# Patient Record
Sex: FEMALE | Race: WHITE | NOT HISPANIC OR LATINO
[De-identification: names, ages, dates, MRNs, and addresses within clinical notes are randomized per-mention and may not be internally consistent; named-entity substitution may affect disease eponyms.]

---

## 2019-10-23 PROBLEM — Z00.00 ENCOUNTER FOR PREVENTIVE HEALTH EXAMINATION: Status: ACTIVE | Noted: 2019-10-23

## 2019-10-25 ENCOUNTER — APPOINTMENT (OUTPATIENT)
Dept: OBGYN | Facility: CLINIC | Age: 34
End: 2019-10-25
Payer: COMMERCIAL

## 2019-10-25 ENCOUNTER — OUTPATIENT (OUTPATIENT)
Dept: OUTPATIENT SERVICES | Facility: HOSPITAL | Age: 34
LOS: 1 days | Discharge: HOME | End: 2019-10-25

## 2019-10-25 VITALS
BODY MASS INDEX: 26.68 KG/M2 | HEIGHT: 66 IN | SYSTOLIC BLOOD PRESSURE: 190 MMHG | HEART RATE: 96 BPM | DIASTOLIC BLOOD PRESSURE: 91 MMHG | WEIGHT: 166 LBS

## 2019-10-25 DIAGNOSIS — Z80.3 FAMILY HISTORY OF MALIGNANT NEOPLASM OF BREAST: ICD-10-CM

## 2019-10-25 DIAGNOSIS — Z80.0 FAMILY HISTORY OF MALIGNANT NEOPLASM OF DIGESTIVE ORGANS: ICD-10-CM

## 2019-10-25 DIAGNOSIS — E28.2 POLYCYSTIC OVARIAN SYNDROME: ICD-10-CM

## 2019-10-25 DIAGNOSIS — Z78.9 OTHER SPECIFIED HEALTH STATUS: ICD-10-CM

## 2019-10-25 DIAGNOSIS — Z20.2 CONTACT WITH AND (SUSPECTED) EXPOSURE TO INFECTIONS WITH A PREDOMINANTLY SEXUAL MODE OF TRANSMISSION: ICD-10-CM

## 2019-10-25 DIAGNOSIS — Z87.42 PERSONAL HISTORY OF OTHER DISEASES OF THE FEMALE GENITAL TRACT: ICD-10-CM

## 2019-10-25 LAB
BILIRUB UR QL STRIP: NORMAL
CLARITY UR: NORMAL
COLLECTION METHOD: NORMAL
GLUCOSE UR-MCNC: NORMAL
HCG UR QL: 0.2 EU/DL
HGB UR QL STRIP.AUTO: ABNORMAL
KETONES UR-MCNC: NORMAL
LEUKOCYTE ESTERASE UR QL STRIP: ABNORMAL
NITRITE UR QL STRIP: NORMAL
PH UR STRIP: 7.5
PROT UR STRIP-MCNC: NORMAL
SP GR UR STRIP: 1.02

## 2019-10-25 PROCEDURE — 81003 URINALYSIS AUTO W/O SCOPE: CPT | Mod: QW

## 2019-10-25 PROCEDURE — 99213 OFFICE O/P EST LOW 20 MIN: CPT

## 2019-10-25 RX ORDER — NORETHINDRONE ACETATE AND ETHINYL ESTRADIOL AND FERROUS FUMARATE 1.5-30(21)
KIT ORAL
Refills: 0 | Status: ACTIVE | COMMUNITY

## 2019-10-25 NOTE — HISTORY OF PRESENT ILLNESS
[Menarche Age: ____] : age at menarche was [unfilled] [Definite:  ___ (Date)] : the last menstrual period was [unfilled] [Contraception] : uses contraception [Sexually Active] : is sexually active [Condoms] : uses condoms [Male ___] : [unfilled] male [Oral Contraceptives] : uses oral contraceptives

## 2019-10-25 NOTE — DISCUSSION/SUMMARY
[FreeTextEntry1] : Patient aware boyfriend diagnosed with Hepatitis C. Wants full STD panel. Has no complaints\par Annual due 3 months\par \par Gabriella Arrieta M.D.\par

## 2019-10-25 NOTE — CHIEF COMPLAINT
[Follow Up] : follow up GYN visit [FreeTextEntry1] : Patient is here requesting lab work. Partner was just diagnosed with HCV 2 days ago, unsure of when contracted. denies gyn complaints. noticed slight increase in frequency of urination. Last PAP was within the year.

## 2019-10-28 LAB
C TRACH RRNA SPEC QL NAA+PROBE: NOT DETECTED
HCV AB SER QL: NONREACTIVE
HCV S/CO RATIO: 0.16 S/CO
HIV1+2 AB SPEC QL IA.RAPID: NONREACTIVE
HSV 1+2 IGG SER IA-IMP: NEGATIVE
HSV 1+2 IGG SER IA-IMP: POSITIVE
HSV1 IGG SER QL: <0.01 INDEX
HSV2 IGG SER QL: 2.71 INDEX
N GONORRHOEA RRNA SPEC QL NAA+PROBE: NOT DETECTED
SOURCE AMPLIFICATION: NORMAL
T PALLIDUM AB SER QL IA: NEGATIVE

## 2020-04-14 ENCOUNTER — RX RENEWAL (OUTPATIENT)
Age: 35
End: 2020-04-14

## 2021-11-23 ENCOUNTER — INPATIENT (INPATIENT)
Facility: HOSPITAL | Age: 36
LOS: 0 days | Discharge: HOME | End: 2021-11-24
Attending: FAMILY MEDICINE | Admitting: FAMILY MEDICINE
Payer: COMMERCIAL

## 2021-11-23 VITALS
DIASTOLIC BLOOD PRESSURE: 70 MMHG | TEMPERATURE: 100 F | HEIGHT: 66 IN | HEART RATE: 105 BPM | OXYGEN SATURATION: 100 % | SYSTOLIC BLOOD PRESSURE: 159 MMHG | RESPIRATION RATE: 16 BRPM | WEIGHT: 190.04 LBS

## 2021-11-23 DIAGNOSIS — K21.9 GASTRO-ESOPHAGEAL REFLUX DISEASE WITHOUT ESOPHAGITIS: ICD-10-CM

## 2021-11-23 DIAGNOSIS — R20.0 ANESTHESIA OF SKIN: ICD-10-CM

## 2021-11-23 DIAGNOSIS — F41.9 ANXIETY DISORDER, UNSPECIFIED: ICD-10-CM

## 2021-11-23 DIAGNOSIS — E28.2 POLYCYSTIC OVARIAN SYNDROME: ICD-10-CM

## 2021-11-23 LAB
ALBUMIN SERPL ELPH-MCNC: 4.4 G/DL — SIGNIFICANT CHANGE UP (ref 3.5–5.2)
ALP SERPL-CCNC: 45 U/L — SIGNIFICANT CHANGE UP (ref 30–115)
ALT FLD-CCNC: 17 U/L — SIGNIFICANT CHANGE UP (ref 0–41)
ANION GAP SERPL CALC-SCNC: 13 MMOL/L — SIGNIFICANT CHANGE UP (ref 7–14)
AST SERPL-CCNC: 21 U/L — SIGNIFICANT CHANGE UP (ref 0–41)
BASOPHILS # BLD AUTO: 0.05 K/UL — SIGNIFICANT CHANGE UP (ref 0–0.2)
BASOPHILS NFR BLD AUTO: 0.7 % — SIGNIFICANT CHANGE UP (ref 0–1)
BILIRUB SERPL-MCNC: 0.5 MG/DL — SIGNIFICANT CHANGE UP (ref 0.2–1.2)
BUN SERPL-MCNC: 11 MG/DL — SIGNIFICANT CHANGE UP (ref 10–20)
CALCIUM SERPL-MCNC: 8.9 MG/DL — SIGNIFICANT CHANGE UP (ref 8.5–10.1)
CHLORIDE SERPL-SCNC: 104 MMOL/L — SIGNIFICANT CHANGE UP (ref 98–110)
CO2 SERPL-SCNC: 23 MMOL/L — SIGNIFICANT CHANGE UP (ref 17–32)
CREAT SERPL-MCNC: 0.8 MG/DL — SIGNIFICANT CHANGE UP (ref 0.7–1.5)
EOSINOPHIL # BLD AUTO: 0.08 K/UL — SIGNIFICANT CHANGE UP (ref 0–0.7)
EOSINOPHIL NFR BLD AUTO: 1.2 % — SIGNIFICANT CHANGE UP (ref 0–8)
ERYTHROCYTE [SEDIMENTATION RATE] IN BLOOD: 9 MM/HR — SIGNIFICANT CHANGE UP (ref 0–20)
GLUCOSE SERPL-MCNC: 100 MG/DL — HIGH (ref 70–99)
HCG SERPL QL: NEGATIVE — SIGNIFICANT CHANGE UP
HCT VFR BLD CALC: 41.9 % — SIGNIFICANT CHANGE UP (ref 37–47)
HGB BLD-MCNC: 14 G/DL — SIGNIFICANT CHANGE UP (ref 12–16)
IMM GRANULOCYTES NFR BLD AUTO: 0.1 % — SIGNIFICANT CHANGE UP (ref 0.1–0.3)
LYMPHOCYTES # BLD AUTO: 3.2 K/UL — SIGNIFICANT CHANGE UP (ref 1.2–3.4)
LYMPHOCYTES # BLD AUTO: 46.6 % — SIGNIFICANT CHANGE UP (ref 20.5–51.1)
MAGNESIUM SERPL-MCNC: 1.9 MG/DL — SIGNIFICANT CHANGE UP (ref 1.8–2.4)
MCHC RBC-ENTMCNC: 33.4 G/DL — SIGNIFICANT CHANGE UP (ref 32–37)
MCHC RBC-ENTMCNC: 33.9 PG — HIGH (ref 27–31)
MCV RBC AUTO: 101.5 FL — HIGH (ref 81–99)
MONOCYTES # BLD AUTO: 0.36 K/UL — SIGNIFICANT CHANGE UP (ref 0.1–0.6)
MONOCYTES NFR BLD AUTO: 5.2 % — SIGNIFICANT CHANGE UP (ref 1.7–9.3)
NEUTROPHILS # BLD AUTO: 3.17 K/UL — SIGNIFICANT CHANGE UP (ref 1.4–6.5)
NEUTROPHILS NFR BLD AUTO: 46.2 % — SIGNIFICANT CHANGE UP (ref 42.2–75.2)
NRBC # BLD: 0 /100 WBCS — SIGNIFICANT CHANGE UP (ref 0–0)
PLATELET # BLD AUTO: 325 K/UL — SIGNIFICANT CHANGE UP (ref 130–400)
POTASSIUM SERPL-MCNC: 4 MMOL/L — SIGNIFICANT CHANGE UP (ref 3.5–5)
POTASSIUM SERPL-SCNC: 4 MMOL/L — SIGNIFICANT CHANGE UP (ref 3.5–5)
PROT SERPL-MCNC: 6.8 G/DL — SIGNIFICANT CHANGE UP (ref 6–8)
RBC # BLD: 4.13 M/UL — LOW (ref 4.2–5.4)
RBC # FLD: 12.6 % — SIGNIFICANT CHANGE UP (ref 11.5–14.5)
SARS-COV-2 RNA SPEC QL NAA+PROBE: SIGNIFICANT CHANGE UP
SODIUM SERPL-SCNC: 140 MMOL/L — SIGNIFICANT CHANGE UP (ref 135–146)
WBC # BLD: 6.87 K/UL — SIGNIFICANT CHANGE UP (ref 4.8–10.8)
WBC # FLD AUTO: 6.87 K/UL — SIGNIFICANT CHANGE UP (ref 4.8–10.8)

## 2021-11-23 PROCEDURE — 99285 EMERGENCY DEPT VISIT HI MDM: CPT

## 2021-11-23 PROCEDURE — 70450 CT HEAD/BRAIN W/O DYE: CPT | Mod: 26,MA

## 2021-11-23 PROCEDURE — 99222 1ST HOSP IP/OBS MODERATE 55: CPT

## 2021-11-23 PROCEDURE — 99223 1ST HOSP IP/OBS HIGH 75: CPT

## 2021-11-23 PROCEDURE — 93971 EXTREMITY STUDY: CPT | Mod: 26,RT

## 2021-11-23 RX ORDER — NORETHINDRONE AND ETHINYL ESTRADIOL 0.4-0.035
1 KIT ORAL
Qty: 0 | Refills: 0 | DISCHARGE

## 2021-11-23 RX ORDER — PANTOPRAZOLE SODIUM 20 MG/1
40 TABLET, DELAYED RELEASE ORAL
Refills: 0 | Status: DISCONTINUED | OUTPATIENT
Start: 2021-11-23 | End: 2021-11-24

## 2021-11-23 RX ORDER — FLUOXETINE HCL 10 MG
1 CAPSULE ORAL
Qty: 0 | Refills: 0 | DISCHARGE

## 2021-11-23 RX ORDER — FLUOXETINE HCL 10 MG
10 CAPSULE ORAL DAILY
Refills: 0 | Status: DISCONTINUED | OUTPATIENT
Start: 2021-11-23 | End: 2021-11-24

## 2021-11-23 RX ORDER — CHLORHEXIDINE GLUCONATE 213 G/1000ML
1 SOLUTION TOPICAL
Refills: 0 | Status: DISCONTINUED | OUTPATIENT
Start: 2021-11-23 | End: 2021-11-24

## 2021-11-23 RX ORDER — ACETAMINOPHEN 500 MG
650 TABLET ORAL EVERY 6 HOURS
Refills: 0 | Status: DISCONTINUED | OUTPATIENT
Start: 2021-11-23 | End: 2021-11-24

## 2021-11-23 RX ORDER — PANTOPRAZOLE SODIUM 20 MG/1
1 TABLET, DELAYED RELEASE ORAL
Qty: 0 | Refills: 0 | DISCHARGE

## 2021-11-23 NOTE — H&P ADULT - PROBLEM SELECTOR PLAN 1
-  - CT head w/o abnormality  - RLE venous duplex negative for DVT  - Neuro consult appreciated  - MR head and c-spine w/ and w/o contrast ordered   - Autoimmune labs sent and pending (CRP, NIKKI, RF, SSA, SSB, dsDNA and CCP)  - Check B12, folate and TSH

## 2021-11-23 NOTE — H&P ADULT - HISTORY OF PRESENT ILLNESS
Pt is a 36F w/ PMH GERD and PCOS being admitted for RLE numbness since this morning upon waking for r/o MS vs autoimmune d/o. Pt describes the numbness as a heaviness w/ intermittent tingling that is from hip to foot in the right leg. Pt states that she can walk normally (observed on exam), but that it feels awkward/clumsy. Pt denies FH MS or other autoimmune conditions. Denies hx HA or vision changes. Denies dizziness, fever/chills, cough, rhinorrhea, chest pain, SOB, NVD, abdominal pain, change in urination and change in BM.       In ED, pt is afebrile w/ WBC: 6.8, BP: 159/70, HR: 105, ESR: 9. CT head negative for acute process. RLE venous duplex negative for DVT.

## 2021-11-23 NOTE — H&P ADULT - ASSESSMENT
ASSESSMENT: Pt is a 36F w/ PMH GERD and PCOS being admitted for RLE numbness since this morning upon waking for r/o MS vs autoimmune d/o. In ED, pt is afebrile w/ WBC: 6.8, BP: 159/70, HR: 105, ESR: 9. CT head negative for acute process. RLE venous duplex negative for DVT.         PLAN: Case d/w Dr. Dexter  - Admit to inpatient level of care - medicine  - AM labs  - Monitor vitals   - Ambulate as tolerated   - Continue home medications  - CHG bath  - Regular diet   - VTE ppx: ambulation  - GI ppx: on protonix

## 2021-11-23 NOTE — H&P ADULT - NSICDXPASTMEDICALHX_GEN_ALL_CORE_FT
PAST MEDICAL HISTORY:  Acid reflux     Heart murmur     Hidradenitis suppurativa     PCOS (polycystic ovarian syndrome)     Vasovagal syncope

## 2021-11-23 NOTE — ED PROVIDER NOTE - PROGRESS NOTE DETAILS
Camilo: 36F on OCPs presents for RLE numbness/tingling started acutely this morning. Neuro exam reveals RLE numbness and difficulty ambulating on heels. Plan is CT head, RLE duplex, labs.  Neurology consulted, spoke to Dr. Rico. Neurology will evaluate pt. Camilo: 36F on OCPs presents for RLE numbness/tingling started acutely this morning. CV RRR, lungs CTAB, R calf nontender. Neuro exam reveals RLE numbness and difficulty ambulating on heels. Plan is CT head, RLE duplex, labs.  Neurology consulted, spoke to Dr. Rico. Neurology will evaluate pt. Camilo: Spoke to Dr. Martinez, plan to admit pt for MRI. Pt agreeable with plan. Covid swab sent.

## 2021-11-23 NOTE — CONSULT NOTE ADULT - ASSESSMENT
ASSESSMENT: Pt is a 36F w/ PMH PCOS w/ chief complaint of RLE numbness since this morning upon waking.        PLAN: Case d/w Dr. Rico   - R/o MS vs other autoimmune pathology  - Recommend MR head and c-spine with and without contrast  - Check B12, folate and TSH  - Send labs for RF, NIKKI, ds-DNA, SSA, SSB, ESR, CRP and anti-CCP   - Neuro to follow

## 2021-11-23 NOTE — ED PROVIDER NOTE - OBJECTIVE STATEMENT
36F PMHx PCOS on OCPs, acid reflux presents for RLE numbness/tingling that started this morning. Pt was walking in her house when she stumbled and had to catch herself on the wall. Still able to ambulate but feels her L leg has to compensate for her R leg due to numbness sensation. She was feeling her normal self yesterday. Denies back pain, incontinence, saddle anesthesia, fever/chills, chest pain, sob, abd pain, n/v/d, normal PO intake. Denies numbness/tingling/weakness in all other extremities. Reports this has never happened to her before.

## 2021-11-23 NOTE — ED PROVIDER NOTE - PHYSICAL EXAMINATION
VITAL SIGNS: I have reviewed nursing notes and confirm.  CONSTITUTIONAL: well-appearing, non-toxic, NAD  SKIN: Warm dry, normal skin turgor  HEAD: NCAT  EYES: EOMI, PERRL, no scleral icterus  ENT: Moist mucous membranes, normal pharynx with no erythema or exudates  NECK: Supple; non tender. Full ROM.   CARD: RRR, no murmurs, rubs or gallops  RESP: clear to ausculation b/l.  No rales, rhonchi, or wheezing.  ABD: soft, non-tender, non-distended, no rebound or guarding.   EXT: Full ROM, no bony tenderness, no calf tenderness  NEURO: PERRL, EOMI, tongue midline, V1-V3 intact, shoulder shrug normal bl, normal motor strength bilaterally, +numbness/tingling of RLE, negative Romberg, negative pronator drift, normal finger to nose, normal gait, difficulty ambulating on heels but able to ambulate on toes  PSYCH: Cooperative, appropriate.

## 2021-11-23 NOTE — CONSULT NOTE ADULT - SUBJECTIVE AND OBJECTIVE BOX
Neurology Consult  Pt is a 36F w/ PMH PCOS w/ chief complaint of RLE numbness since this morning upon waking. Pt describes the numbness as a heaviness w/ intermittent tingling that is from hip to foot in the right leg. Pt states that she can walk normally (observed on exam), but that it feels awkward/clumsy. Pt denies FH MS or other autoimmune conditions. Denies hx HA or vision changes. CT head negative for acute findings. Duplex of b/l LE was negative for DVT.       PAST MEDICAL & SURGICAL HISTORY:  Acid reflux  PCOS (polycystic ovarian syndrome)  Heart murmur      FAMILY HISTORY: No FH neurological or rheumatological conditions       Social History: +vaping, social drinking, denies drug use    Allergies  No Known Allergies        Vital Signs Last 24 Hrs  T(C): 37.6 (23 Nov 2021 08:16), Max: 37.6 (23 Nov 2021 08:16)  T(F): 99.6 (23 Nov 2021 08:16), Max: 99.6 (23 Nov 2021 08:16)  HR: 105 (23 Nov 2021 08:16) (105 - 105)  BP: 159/70 (23 Nov 2021 08:16) (159/70 - 159/70)  BP(mean): --  RR: 16 (23 Nov 2021 08:16) (16 - 16)  SpO2: 100% (23 Nov 2021 08:16) (100% - 100%)    Examination:  General:  Appearance is consistent with chronologic age.  No abnormal facies.   Cognitive/Language:  The patient is oriented to person, place, time and date.  Recent and remote memory intact.  Fund of knowledge is intact and normal.  Language with normal repetition, comprehension and naming.  Nondysarthric.    Eyes: intact VA, VFF.  EOMI w/o nystagmus, skew or reported double vision.  PERRL.   Face:  Facial sensation normal V1 - 3, no facial asymmetry.    Ears/Nose/Throat:  Hearing grossly intact b/l.  Palate elevates midline.  Tongue and uvula midline.   Motor examination:   Normal tone, bulk and range of motion.  No tenderness, twitching, tremors or involuntary movements.  Formal Muscle Strength Testing: (MRC grade R/L) 5/5 UE; 5/5 LE.  No observable drift.  Reflexes:  + hyperreflexia in b/l pectoralis, biceps, triceps, brachioradialis, patella and Achilles. +clonus  Sensory examination:   Intact to light touch and temperature in all extremities.  Cerebellum:   FTN/HKS intact with normal RICHA in all limbs.  No dysmetria or dysdiadokinesia.  Gait narrow based and normal.        Labs:   CBC Full  -  ( 23 Nov 2021 08:20 )  WBC Count : 6.87 K/uL  RBC Count : 4.13 M/uL  Hemoglobin : 14.0 g/dL  Hematocrit : 41.9 %  Platelet Count - Automated : 325 K/uL  Mean Cell Volume : 101.5 fL  Mean Cell Hemoglobin : 33.9 pg  Mean Cell Hemoglobin Concentration : 33.4 g/dL  Auto Neutrophil # : 3.17 K/uL  Auto Lymphocyte # : 3.20 K/uL  Auto Monocyte # : 0.36 K/uL  Auto Eosinophil # : 0.08 K/uL  Auto Basophil # : 0.05 K/uL  Auto Neutrophil % : 46.2 %  Auto Lymphocyte % : 46.6 %  Auto Monocyte % : 5.2 %  Auto Eosinophil % : 1.2 %  Auto Basophil % : 0.7 %    11-23    140  |  104  |  11  ----------------------------<  100<H>  4.0   |  23  |  0.8    Ca    8.9      23 Nov 2021 08:20  Mg     1.9     11-23    TPro  6.8  /  Alb  4.4  /  TBili  0.5  /  DBili  x   /  AST  21  /  ALT  17  /  AlkPhos  45  11-23    LIVER FUNCTIONS - ( 23 Nov 2021 08:20 )  Alb: 4.4 g/dL / Pro: 6.8 g/dL / ALK PHOS: 45 U/L / ALT: 17 U/L / AST: 21 U/L / GGT: x                   Neuroimaging:  < from: CT Head No Cont (11.23.21 @ 08:46) >  IMPRESSION:    No acute intracranial pathology, stable exam since 8/25/2013.    --- End of Report ---    < end of copied text >

## 2021-11-23 NOTE — H&P ADULT - PROBLEM SELECTOR PLAN 2
-  - Currently on Junel 1.5/30 birth control  - Instructed pt to bring in BC from home and we will verify with pharmacy

## 2021-11-23 NOTE — ED PROVIDER NOTE - NS ED ROS FT
Constitutional: (-) diaphoresis  Eyes/ENT: (-) runny nose  Cardiovascular: (-) chest pain  Respiratory: (-) cough  Gastrointestinal: (-) vomiting, (-) diarrhea  : (-) dysuria   Musculoskeletal: (-) joint pain  Integumentary: (-) rash  Neurological: see HPI  Allergic/Immunologic: (-) pruritus

## 2021-11-23 NOTE — H&P ADULT - NSHPLABSRESULTS_GEN_ALL_CORE
14.0   6.87  )-----------( 325      ( 23 Nov 2021 08:20 )             41.9       11-23    140  |  104  |  11  ----------------------------<  100<H>  4.0   |  23  |  0.8    Ca    8.9      23 Nov 2021 08:20  Mg     1.9     11-23    TPro  6.8  /  Alb  4.4  /  TBili  0.5  /  DBili  x   /  AST  21  /  ALT  17  /  AlkPhos  45  11-23          Magnesium, Serum: 1.9 mg/dL (11-23-21 @ 08:20)    Serum Pregnancy: Negative (11-23-21 @ 08:20)          < from: CT Head No Cont (11.23.21 @ 08:46) >    IMPRESSION:    No acute intracranial pathology, stable exam since 8/25/2013.    --- End of Report ---    < end of copied text >    < from: CT Head No Cont (11.23.21 @ 08:46) >

## 2021-11-23 NOTE — H&P ADULT - NSHPPHYSICALEXAM_GEN_ALL_CORE
T(C): 37.6 (11-23-21 @ 08:16), Max: 37.6 (11-23-21 @ 08:16)  HR: 105 (11-23-21 @ 08:16) (105 - 105)  BP: 159/70 (11-23-21 @ 08:16) (159/70 - 159/70)  RR: 16 (11-23-21 @ 08:16) (16 - 16)  SpO2: 100% (11-23-21 @ 08:16) (100% - 100%)    PHYSICAL EXAM:  GENERAL: NAD, AOx3  HEAD:  Atraumatic, Normocephalic  EYES: EOMI, PERRLA, conjunctiva and sclera clear  CHEST/LUNG: Clear to auscultation bilaterally; No rales, rhonchi or wheezing  HEART: S1,S2 Regular rate and rhythm; No murmurs, rubs, or gallops  ABDOMEN: Soft, nontender, nondistended, no rebound tenderness; +BS  EXTREMITIES:  2+ peripheral pulses bilaterally and symmetrically, no clubbing, cyanosis, or edema  PSYCH: AAOx3, normal mood and affect  NEUROLOGY: +hyperreflexia, +clonus, muscle strength 5/5 throughout, no sensory deficits   SKIN: no rashes

## 2021-11-23 NOTE — CONSULT NOTE ADULT - ATTENDING COMMENTS
Patient seen and examined and agree with above except as noted. Patients history, notes, labs, imaging, vitals and meds reviewed personally.    Patient woke this morning with numbness of the right LE from the hip down.  Exam shows hyper reflexia throughout slightly brisker on the right    Plan as above (discussed with patient the possible differentials)

## 2021-11-23 NOTE — ED ADULT TRIAGE NOTE - CHIEF COMPLAINT QUOTE
"I woke up this morning. My right leg was tingling. It threw my balance off and I went into the wall. It feels like dead weight."

## 2021-11-23 NOTE — ED PROVIDER NOTE - ATTENDING CONTRIBUTION TO CARE
0
36 y.o. F, PMH of PCOS w/ chief complaint of RLE numbness since this morning upon waking. Pt describes the numbness as a heaviness w/ intermittent tingling that is from hip to foot in the right leg. Pt states that she can walk normally, but it feels awkward/clumsy. No HA. No n/v/c/d. No neck pain. No back pain. No leg weakness. No bowel/bladder incontinence. On exam, pt in NAD, AAOx3, Eyes: intact VA, EOMI w/o nystagmus, PERRL. CN II-XII intact. Hearing grossly intact b/l.  Palate elevates midline.  Tongue and uvula midline. Motor examination:   Normal tone, bulk and range of motion.  No tenderness, twitching, tremors or involuntary movements. Muscle Strength: 5/5 UE; 5/5 LE. No drift. Reflexes: (+)hyperreflexia in b/l pectoralis, biceps, triceps, brachioradialis, patella and Achilles. Sensory examination: Intact to light touch and temperature in all extremities. Finger to nose intact. Gait narrow based and normal. Labs/CT done and reviewed. Neuro evaluated pt. Will admit for MRI.

## 2021-11-23 NOTE — ED PROVIDER NOTE - CLINICAL SUMMARY MEDICAL DECISION MAKING FREE TEXT BOX
36 y.o. F, PMH of PCOS w/ chief complaint of RLE numbness since this morning upon waking. Pt describes the numbness as a heaviness w/ intermittent tingling that is from hip to foot in the right leg. Pt states that she can walk normally, but it feels awkward/clumsy. No HA. No n/v/c/d. No neck pain. No back pain. No leg weakness. No bowel/bladder incontinence. On exam, pt in NAD, AAOx3, Eyes: intact VA, EOMI w/o nystagmus, PERRL. CN II-XII intact. Hearing grossly intact b/l.  Palate elevates midline.  Tongue and uvula midline. Motor examination:   Normal tone, bulk and range of motion.  No tenderness, twitching, tremors or involuntary movements. Muscle Strength: 5/5 UE; 5/5 LE. No drift. Reflexes: (+)hyperreflexia in b/l pectoralis, biceps, triceps, brachioradialis, patella and Achilles. Sensory examination: Intact to light touch and temperature in all extremities. Finger to nose intact. Gait narrow based and normal. Labs/CT done and reviewed. Neuro evaluated pt. Will admit for MRI.

## 2021-11-24 ENCOUNTER — TRANSCRIPTION ENCOUNTER (OUTPATIENT)
Age: 36
End: 2021-11-24

## 2021-11-24 VITALS
HEART RATE: 78 BPM | DIASTOLIC BLOOD PRESSURE: 59 MMHG | RESPIRATION RATE: 18 BRPM | SYSTOLIC BLOOD PRESSURE: 122 MMHG | TEMPERATURE: 98 F

## 2021-11-24 LAB
ANION GAP SERPL CALC-SCNC: 11 MMOL/L — SIGNIFICANT CHANGE UP (ref 7–14)
BUN SERPL-MCNC: 11 MG/DL — SIGNIFICANT CHANGE UP (ref 10–20)
CALCIUM SERPL-MCNC: 8.8 MG/DL — SIGNIFICANT CHANGE UP (ref 8.5–10.1)
CCP IGG SERPL-ACNC: <8 UNITS — SIGNIFICANT CHANGE UP
CHLORIDE SERPL-SCNC: 104 MMOL/L — SIGNIFICANT CHANGE UP (ref 98–110)
CO2 SERPL-SCNC: 25 MMOL/L — SIGNIFICANT CHANGE UP (ref 17–32)
COVID-19 NUCLEOCAPSID GAM AB INTERP: NEGATIVE — SIGNIFICANT CHANGE UP
COVID-19 NUCLEOCAPSID TOTAL GAM ANTIBODY RESULT: 0.18 INDEX — SIGNIFICANT CHANGE UP
COVID-19 SPIKE DOMAIN AB INTERP: POSITIVE
COVID-19 SPIKE DOMAIN ANTIBODY RESULT: 224 U/ML — HIGH
CREAT SERPL-MCNC: 0.8 MG/DL — SIGNIFICANT CHANGE UP (ref 0.7–1.5)
CRP SERPL-MCNC: 6 MG/L — HIGH
DSDNA AB FLD-ACNC: <0.2 AI — SIGNIFICANT CHANGE UP
DSDNA AB SER-ACNC: <12 IU/ML — SIGNIFICANT CHANGE UP
ENA SS-A AB FLD IA-ACNC: <0.2 AI — SIGNIFICANT CHANGE UP
FOLATE SERPL-MCNC: 8.7 NG/ML — SIGNIFICANT CHANGE UP
GLUCOSE SERPL-MCNC: 95 MG/DL — SIGNIFICANT CHANGE UP (ref 70–99)
HCT VFR BLD CALC: 40.1 % — SIGNIFICANT CHANGE UP (ref 37–47)
HGB BLD-MCNC: 13.6 G/DL — SIGNIFICANT CHANGE UP (ref 12–16)
MCHC RBC-ENTMCNC: 33.9 G/DL — SIGNIFICANT CHANGE UP (ref 32–37)
MCHC RBC-ENTMCNC: 34.2 PG — HIGH (ref 27–31)
MCV RBC AUTO: 100.8 FL — HIGH (ref 81–99)
NRBC # BLD: 0 /100 WBCS — SIGNIFICANT CHANGE UP (ref 0–0)
PLATELET # BLD AUTO: 295 K/UL — SIGNIFICANT CHANGE UP (ref 130–400)
POTASSIUM SERPL-MCNC: 4.2 MMOL/L — SIGNIFICANT CHANGE UP (ref 3.5–5)
POTASSIUM SERPL-SCNC: 4.2 MMOL/L — SIGNIFICANT CHANGE UP (ref 3.5–5)
RBC # BLD: 3.98 M/UL — LOW (ref 4.2–5.4)
RBC # FLD: 12.5 % — SIGNIFICANT CHANGE UP (ref 11.5–14.5)
RF+CCP IGG SER-IMP: NEGATIVE — SIGNIFICANT CHANGE UP
RHEUMATOID FACT SERPL-ACNC: <10 IU/ML — SIGNIFICANT CHANGE UP (ref 0–13)
SARS-COV-2 IGG+IGM SERPL QL IA: 0.18 INDEX — SIGNIFICANT CHANGE UP
SARS-COV-2 IGG+IGM SERPL QL IA: 224 U/ML — HIGH
SARS-COV-2 IGG+IGM SERPL QL IA: NEGATIVE — SIGNIFICANT CHANGE UP
SARS-COV-2 IGG+IGM SERPL QL IA: POSITIVE
SODIUM SERPL-SCNC: 140 MMOL/L — SIGNIFICANT CHANGE UP (ref 135–146)
TSH SERPL-MCNC: 7.59 UIU/ML — HIGH (ref 0.27–4.2)
VIT B12 SERPL-MCNC: 577 PG/ML — SIGNIFICANT CHANGE UP (ref 232–1245)
WBC # BLD: 8.72 K/UL — SIGNIFICANT CHANGE UP (ref 4.8–10.8)
WBC # FLD AUTO: 8.72 K/UL — SIGNIFICANT CHANGE UP (ref 4.8–10.8)

## 2021-11-24 PROCEDURE — 72156 MRI NECK SPINE W/O & W/DYE: CPT | Mod: 26

## 2021-11-24 PROCEDURE — 99232 SBSQ HOSP IP/OBS MODERATE 35: CPT

## 2021-11-24 PROCEDURE — 70553 MRI BRAIN STEM W/O & W/DYE: CPT | Mod: 26

## 2021-11-24 PROCEDURE — 99233 SBSQ HOSP IP/OBS HIGH 50: CPT

## 2021-11-24 RX ADMIN — PANTOPRAZOLE SODIUM 40 MILLIGRAM(S): 20 TABLET, DELAYED RELEASE ORAL at 05:44

## 2021-11-24 NOTE — PROGRESS NOTE ADULT - SUBJECTIVE AND OBJECTIVE BOX
Patient is a 36y old  Female who presents with a chief complaint of RLE numbness which spontaneously resolved, pt was consulted by neurology, MRI ( brain and C-spine) was normal, pt was cleared for discharge home with PMD and neurology follow up shortly after discharge.   Today pt feels OK and denies any complaints.       Vital Signs Last 24 Hrs  T(C): 36.6 (24 Nov 2021 05:00), Max: 37.3 (23 Nov 2021 17:45)  T(F): 97.9 (24 Nov 2021 05:00), Max: 99.1 (23 Nov 2021 17:45)  HR: 78 (24 Nov 2021 05:00) (71 - 78)  BP: 122/59 (24 Nov 2021 05:00) (116/57 - 152/69)  BP(mean): --  RR: 18 (24 Nov 2021 05:00) (18 - 18)    PHYSICAL EXAM:  GENERAL: NAD, well-groomed, well-developed  HEAD:  Atraumatic, Normocephalic  EYES: EOMI, PERRLA, conjunctiva and sclera clear  ENMT: No tonsillar erythema, exudates, or enlargement; Moist mucous membranes, Good dentition, No lesions  NECK: Supple, No JVD, Normal thyroid  NERVOUS SYSTEM:  Alert & Oriented X3, Good concentration; Motor Strength 5/5 B/L upper and lower extremities; DTRs 2+ intact and symmetric  CHEST/LUNG: Clear to percussion bilaterally; No rales, rhonchi, wheezing, or rubs  HEART: Regular rate and rhythm; No murmurs, rubs, or gallops  ABDOMEN: Soft, Nontender, Nondistended; Bowel sounds present  EXTREMITIES:  2+ Peripheral Pulses, No clubbing, cyanosis, or edema  LYMPH: No lymphadenopathy noted  SKIN: No rashes or lesions      LABS:                        13.6   8.72  )-----------( 295      ( 24 Nov 2021 06:19 )             40.1     11-24    140  |  104  |  11  ----------------------------<  95  4.2   |  25  |  0.8    Ca    8.8      24 Nov 2021 06:19  Mg     1.9     11-23    TPro  6.8  /  Alb  4.4  /  TBili  0.5  /  DBili  x   /  AST  21  /  ALT  17  /  AlkPhos  45  11-23      RADIOLOGY & ADDITIONAL TESTS:  < from: MR Cervical Spine w/wo IV Cont (11.24.21 @ 13:22) >  IMPRESSION:    No evidence of abnormal spinal cord signal or enhancement to suggest demyelination.    < end of copied text >  < from: MR Head w/wo IV Cont (11.24.21 @ 13:12) >    IMPRESSION:    Unremarkable contrast-enhanced examination of the brain.    < end of copied text >    MEDICATIONS  (STANDING):  chlorhexidine 4% Liquid 1 Application(s) Topical <User Schedule>  FLUoxetine 10 milliGRAM(s) Oral daily  pantoprazole    Tablet 40 milliGRAM(s) Oral before breakfast    MEDICATIONS  (PRN):  acetaminophen     Tablet .. 650 milliGRAM(s) Oral every 6 hours PRN Temp greater or equal to 38C (100.4F), Mild Pain (1 - 3)      
Neurology Progress Note    Interval History:  Patient seen at bedside this AM. No acute interval events. Pt reports resolution of numbness of RLE. Hyperreflexia of b/l UE resolved, but still present to b/l LE. Head CT WNL. MRI pending.     HPI:  Pt is a 36F w/ PMH GERD and PCOS being admitted for RLE numbness since this morning upon waking for r/o MS vs autoimmune d/o. Pt describes the numbness as a heaviness w/ intermittent tingling that is from hip to foot in the right leg. Pt states that she can walk normally (observed on exam), but that it feels awkward/clumsy. Pt denies FH MS or other autoimmune conditions. Denies hx HA or vision changes. Denies dizziness, fever/chills, cough, rhinorrhea, chest pain, SOB, NVD, abdominal pain, change in urination and change in BM.       In ED, pt is afebrile w/ WBC: 6.8, BP: 159/70, HR: 105, ESR: 9. CT head negative for acute process. RLE venous duplex negative for DVT.  (23 Nov 2021 12:36)      PAST MEDICAL & SURGICAL HISTORY:  Heart murmur  Vasovagal syncope  Hidradenitis suppurativa  PCOS (polycystic ovarian syndrome)  Acid reflux  No significant past surgical history      Medications:  acetaminophen     Tablet .. 650 milliGRAM(s) Oral every 6 hours PRN  chlorhexidine 4% Liquid 1 Application(s) Topical <User Schedule>  FLUoxetine 10 milliGRAM(s) Oral daily  pantoprazole    Tablet 40 milliGRAM(s) Oral before breakfast      Vital Signs Last 24 Hrs  T(C): 36.6 (24 Nov 2021 05:00), Max: 37.3 (23 Nov 2021 17:45)  T(F): 97.9 (24 Nov 2021 05:00), Max: 99.1 (23 Nov 2021 17:45)  HR: 78 (24 Nov 2021 05:00) (71 - 78)  BP: 122/59 (24 Nov 2021 05:00) (116/57 - 152/69)  BP(mean): --  RR: 18 (24 Nov 2021 05:00) (18 - 18)  SpO2: --    Examination:  General:  Appearance is consistent with chronologic age.  No abnormal facies.   Cognitive/Language:  The patient is oriented to person, place, time and date.  Recent and remote memory intact.  Fund of knowledge is intact and normal.  Language with normal repetition, comprehension and naming.  Nondysarthric.    Eyes: intact VA, VFF.  EOMI w/o nystagmus, skew or reported double vision.  PERRL.   Face:  Facial sensation normal V1 - 3, no facial asymmetry.    Ears/Nose/Throat:  Hearing grossly intact b/l.  Palate elevates midline.  Tongue and uvula midline.   Motor examination:   Normal tone, bulk and range of motion.  No tenderness, twitching, tremors or involuntary movements.  Formal Muscle Strength Testing: (MRC grade R/L) 5/5 UE; 5/5 LE.  No observable drift.  Reflexes:  +2 reflexes in b/l pectoralis, biceps, triceps, and brachioradialis. +hyperreflexia in patella and Achilles. +clonus  Sensory examination:   Intact to light touch and temperature in all extremities.  Cerebellum:   FTN/HKS intact with normal RICHA in all limbs.  No dysmetria or dysdiadokinesia.  Gait narrow based and normal.    Labs:  CBC Full  -  ( 24 Nov 2021 06:19 )  WBC Count : 8.72 K/uL  RBC Count : 3.98 M/uL  Hemoglobin : 13.6 g/dL  Hematocrit : 40.1 %  Platelet Count - Automated : 295 K/uL  Mean Cell Volume : 100.8 fL  Mean Cell Hemoglobin : 34.2 pg  Mean Cell Hemoglobin Concentration : 33.9 g/dL  Auto Neutrophil # : x  Auto Lymphocyte # : x  Auto Monocyte # : x  Auto Eosinophil # : x  Auto Basophil # : x  Auto Neutrophil % : x  Auto Lymphocyte % : x  Auto Monocyte % : x  Auto Eosinophil % : x  Auto Basophil % : x    11-24    140  |  104  |  11  ----------------------------<  95  4.2   |  25  |  0.8    Ca    8.8      24 Nov 2021 06:19  Mg     1.9     11-23    TPro  6.8  /  Alb  4.4  /  TBili  0.5  /  DBili  x   /  AST  21  /  ALT  17  /  AlkPhos  45  11-23    LIVER FUNCTIONS - ( 23 Nov 2021 08:20 )  Alb: 4.4 g/dL / Pro: 6.8 g/dL / ALK PHOS: 45 U/L / ALT: 17 U/L / AST: 21 U/L / GGT: x             < from: CT Head No Cont (11.23.21 @ 08:46) >  IMPRESSION:    No acute intracranial pathology, stable exam since 8/25/2013.    --- End of Report ---    < end of copied text >      < from: VA Duplex Lower Ext Vein Scan, Right (11.23.21 @ 09:01) >  Impression:    No evidence of deep venous thrombosis in the bilateral lower extremities.    ICD-10: M79.89    --- End of Report ---    < end of copied text >

## 2021-11-24 NOTE — PROGRESS NOTE ADULT - ASSESSMENT
Patient is a 36y old  Female who presents with a chief complaint of RLE numbness which spontaneously resolved, pt was consulted by neurology, MRI ( brain and C-spine) was normal, pt was cleared for discharge home with PMD and neurology follow up shortly after discharge.     A/P  # RLE numbness   - resolved   - pt was consulted by neurology, recommendations noted   - MRI C-spine and brain negative   - B12, folate pending '  -  TSH slightly elevated, f/u FT4   - RF and ESR WNL, CRP mildly elevated   - NIKKI, ds-DNA, SSA, SSB, and anti-CCP pending results   - pt is stable  for discharge with outpatient neurology follow up and was likely peripheral nerve compression    # anxiety  - c/w home meds     # Polycystic ovarial syndrome   - c/w birth-control pills       # Dispo: pt is stable for discharge home with OP neurology follow up

## 2021-11-24 NOTE — DISCHARGE NOTE PROVIDER - CARE PROVIDERS DIRECT ADDRESSES
[email or web address]

## 2021-11-24 NOTE — DISCHARGE NOTE PROVIDER - CARE PROVIDER_API CALL
Jake Garrison)  EEGEpilepsy; Neurology  [address], [address]  [place] [address]  Phone: [phone]  Fax: [phone]  Follow Up Time:     Blayne Robbins  [address]  [address] [place] [address]  Phone: [phone]  Fax: [phone]  Follow Up Time:

## 2021-11-24 NOTE — PROGRESS NOTE ADULT - ATTENDING COMMENTS
Patient seen and examined and agree with above except as noted. Patients history, notes, labs, imaging, vitals and meds reviewed personally.  Patient with resolution of symptoms from yesterday.  Reflexes improved in UE and still brisk in LE with 2 beats of clonus on right and none on left.  Plantars down b/l feet    Plan as above

## 2021-11-24 NOTE — DISCHARGE NOTE PROVIDER - NSDCMRMEDTOKEN_GEN_ALL_CORE_FT
Junel Fe 1.5/30 oral tablet: 1 tab(s) orally once a day  pantoprazole 40 mg oral delayed release tablet: 1 tab(s) orally once a day  PROzac 10 mg oral capsule: 1 cap(s) orally once a day

## 2021-11-24 NOTE — DISCHARGE NOTE PROVIDER - NSDCCPCAREPLAN_GEN_ALL_CORE_FT
PRINCIPAL DISCHARGE DIAGNOSIS  Diagnosis: Numbness or tingling  Assessment and Plan of Treatment: f/u with PMD and neurology after discharge

## 2021-11-24 NOTE — DISCHARGE NOTE NURSING/CASE MANAGEMENT/SOCIAL WORK - PATIENT PORTAL LINK FT
You can access the FollowMyHealth Patient Portal offered by [facility] by registering at the following website: [email or web address]. By joining [facility]’s FollowMyHealth portal, you will also be able to view your health information using other applications (apps) compatible with our system.

## 2021-11-24 NOTE — PROGRESS NOTE ADULT - ASSESSMENT
ASSESSMENT: Pt is a 36F w/ PMH PCOS w/ chief complaint of RLE numbness since this morning upon waking. Now resolved.         PLAN: Case d/w Dr. Rico   - R/o MS vs other autoimmune pathology  - RLE numbness now resolved   - MR head and c-spine with and without contrast - scheduled for this morning   - B12, folate and TSH pending from this AM  - RF and ESR WNL; CRP mildly elevated   - NIKKI, ds-DNA, SSA, SSB, and anti-CCP pending results   - Neuro to follow  ASSESSMENT: Pt is a 36F w/ PMH PCOS w/ chief complaint of RLE numbness since this morning upon waking. Now resolved.         PLAN: Case d/w Dr. Rico   - R/o MS vs other autoimmune pathology  - RLE numbness now resolved   - MR head and c-spine with and without contrast - scheduled for this morning   - B12, folate and TSH pending from this AM  - RF and ESR WNL; CRP mildly elevated   - NIKKI, ds-DNA, SSA, SSB, and anti-CCP pending results   - IF MRI suggestive of MS without any active lesions - recommend IV solumedrol x3 days  - IF MRI suggestive of MS with active lesion/s - recommend IV solumedrol x5 days  - IF NO evidence of MS on MRI - pt ok for discharge with outpatient neurology follow up   - Neuro to follow  ASSESSMENT: Pt is a 36F w/ PMH PCOS w/ chief complaint of RLE numbness since this morning upon waking. Now resolved.         PLAN: Case d/w Dr. Rico   - R/o MS vs other autoimmune pathology  - RLE numbness now resolved   - MR head and c-spine with and without contrast - scheduled for this morning   - B12, folate and TSH pending from this AM  - RF and ESR WNL; CRP mildly elevated   - NIKKI, ds-DNA, SSA, SSB, and anti-CCP pending results   - IF MRI suggestive of MS without any active lesions - recommend IV solumedrol x3 days  - IF MRI suggestive of MS with active lesion/s - recommend IV solumedrol x5 days  - IF NO evidence of MS on MRI - pt ok for discharge with outpatient neurology follow up and was likely peripheral nerve compression  - Neuro to follow

## 2021-11-24 NOTE — DISCHARGE NOTE PROVIDER - HOSPITAL COURSE
36y old  Female who presents with a chief complaint of RLE numbness which spontaneously resolved, pt was consulted by neurology, MRI ( brain and C-spine) was normal, pt was cleared for discharge home with PMD and neurology follow up shortly after discharge.

## 2021-11-26 LAB — ANA TITR SER: NEGATIVE — SIGNIFICANT CHANGE UP

## 2021-11-30 DIAGNOSIS — E28.2 POLYCYSTIC OVARIAN SYNDROME: ICD-10-CM

## 2021-11-30 DIAGNOSIS — F41.9 ANXIETY DISORDER, UNSPECIFIED: ICD-10-CM

## 2021-11-30 DIAGNOSIS — L73.2 HIDRADENITIS SUPPURATIVA: ICD-10-CM

## 2021-11-30 DIAGNOSIS — R20.0 ANESTHESIA OF SKIN: ICD-10-CM

## 2021-11-30 DIAGNOSIS — K21.9 GASTRO-ESOPHAGEAL REFLUX DISEASE WITHOUT ESOPHAGITIS: ICD-10-CM

## 2021-11-30 DIAGNOSIS — G57.91 UNSPECIFIED MONONEUROPATHY OF RIGHT LOWER LIMB: ICD-10-CM

## 2021-11-30 DIAGNOSIS — R29.2 ABNORMAL REFLEX: ICD-10-CM

## 2022-08-17 NOTE — DISCHARGE NOTE NURSING/CASE MANAGEMENT/SOCIAL WORK - HISTORY OF COVID-19 VACCINATION
Feels the urge and has to go right then; over the summer every time she drinks something she has to go to the restroom     Yes

## 2023-01-02 ENCOUNTER — NON-APPOINTMENT (OUTPATIENT)
Age: 38
End: 2023-01-02

## 2023-05-17 ENCOUNTER — NON-APPOINTMENT (OUTPATIENT)
Age: 38
End: 2023-05-17

## 2023-08-22 ENCOUNTER — NON-APPOINTMENT (OUTPATIENT)
Age: 38
End: 2023-08-22

## 2024-06-17 ENCOUNTER — NON-APPOINTMENT (OUTPATIENT)
Age: 39
End: 2024-06-17

## 2024-09-10 ENCOUNTER — NON-APPOINTMENT (OUTPATIENT)
Age: 39
End: 2024-09-10